# Patient Record
Sex: FEMALE | Race: WHITE
[De-identification: names, ages, dates, MRNs, and addresses within clinical notes are randomized per-mention and may not be internally consistent; named-entity substitution may affect disease eponyms.]

---

## 2018-05-20 ENCOUNTER — HOSPITAL ENCOUNTER (EMERGENCY)
Dept: HOSPITAL 89 - ER | Age: 58
Discharge: HOME | End: 2018-05-20
Payer: COMMERCIAL

## 2018-05-20 VITALS — DIASTOLIC BLOOD PRESSURE: 94 MMHG | SYSTOLIC BLOOD PRESSURE: 128 MMHG

## 2018-05-20 DIAGNOSIS — R00.8: Primary | ICD-10-CM

## 2018-05-20 DIAGNOSIS — R94.31: ICD-10-CM

## 2018-05-20 DIAGNOSIS — R55: ICD-10-CM

## 2018-05-20 DIAGNOSIS — R00.2: ICD-10-CM

## 2018-05-20 LAB
INR PPP: 0.96
PLATELET COUNT, AUTOMATED: 240 K/UL (ref 150–450)

## 2018-05-20 PROCEDURE — 84450 TRANSFERASE (AST) (SGOT): CPT

## 2018-05-20 PROCEDURE — 84520 ASSAY OF UREA NITROGEN: CPT

## 2018-05-20 PROCEDURE — 84460 ALANINE AMINO (ALT) (SGPT): CPT

## 2018-05-20 PROCEDURE — 82040 ASSAY OF SERUM ALBUMIN: CPT

## 2018-05-20 PROCEDURE — 85730 THROMBOPLASTIN TIME PARTIAL: CPT

## 2018-05-20 PROCEDURE — 85610 PROTHROMBIN TIME: CPT

## 2018-05-20 PROCEDURE — 71045 X-RAY EXAM CHEST 1 VIEW: CPT

## 2018-05-20 PROCEDURE — 84075 ASSAY ALKALINE PHOSPHATASE: CPT

## 2018-05-20 PROCEDURE — 93005 ELECTROCARDIOGRAM TRACING: CPT

## 2018-05-20 PROCEDURE — 99284 EMERGENCY DEPT VISIT MOD MDM: CPT

## 2018-05-20 PROCEDURE — 84484 ASSAY OF TROPONIN QUANT: CPT

## 2018-05-20 PROCEDURE — 83735 ASSAY OF MAGNESIUM: CPT

## 2018-05-20 PROCEDURE — 85025 COMPLETE CBC W/AUTO DIFF WBC: CPT

## 2018-05-20 PROCEDURE — 84155 ASSAY OF PROTEIN SERUM: CPT

## 2018-05-20 PROCEDURE — 83880 ASSAY OF NATRIURETIC PEPTIDE: CPT

## 2018-05-20 PROCEDURE — 84295 ASSAY OF SERUM SODIUM: CPT

## 2018-05-20 PROCEDURE — 82565 ASSAY OF CREATININE: CPT

## 2018-05-20 PROCEDURE — 85379 FIBRIN DEGRADATION QUANT: CPT

## 2018-05-20 PROCEDURE — 82247 BILIRUBIN TOTAL: CPT

## 2018-05-20 PROCEDURE — 82310 ASSAY OF CALCIUM: CPT

## 2018-05-20 PROCEDURE — 96360 HYDRATION IV INFUSION INIT: CPT

## 2018-05-20 PROCEDURE — 82947 ASSAY GLUCOSE BLOOD QUANT: CPT

## 2018-05-20 PROCEDURE — 82374 ASSAY BLOOD CARBON DIOXIDE: CPT

## 2018-05-20 PROCEDURE — 84132 ASSAY OF SERUM POTASSIUM: CPT

## 2018-05-20 PROCEDURE — 82435 ASSAY OF BLOOD CHLORIDE: CPT

## 2018-05-20 NOTE — ER REPORT
History and Physical


Time Seen By MD:  05:40


Hx. of Stated Complaint:  


PT WAS AWOKEN WITH SUDDEN CHEST PAIN.


HPI/ROS


CHIEF COMPLAINT: Palpitations, dizziness





HISTORY OF PRESENT ILLNESS: 57-year-old female presents to the ER.  He woke up 

with chest pain, feeling near syncopal..  She's been having bouts of 

tachycardia for 3-4 weeks, similar to episodes she had 10 years ago.  She was 

evaluated by Dr. Cheyanne Richard electrophysiologist at [facility].[address] 

[facility] [address], [place] [address] [phone] and underwent an 

ablation approximately 10 years ago.  She states she was on metoprolol back 

then..





REVIEW OF SYSTEMS:


Respiratory: As above


Cardiovascular: As above


Gastrointestinal: No vomiting, no abdominal pain.


Musculoskeletal: No back pain.


Allergies:  


Coded Allergies:  


     No Known Drug Allergies (Verified , 5/20/18)


Home Meds


Active Scripts


Metoprolol Tartrate (METOPROLOL TARTRATE) 25 Mg Tablet, 1 TAB PO BID for 

prevention of heart racing, #60 TAB


   Prov:FRANTZ SANTIAGO DO         5/20/18


Reported Medications


Lorazepam (Lorazepam) 1 Mg Tablet, 1 MG PO Q6H, #10 0 Refills


   1/29/10


Omega-3 Acid Ethyl Esters (Lovaza) 1 G Capsule, 1 G PO D, 0 Refills


   1/29/10


Hx Substance Use Disorder:  No


Hx Alcohol Use:  No


Constitutional





Vital Sign - Last 24 Hours








 5/20/18 5/20/18 5/20/18 5/20/18





 05:35 05:35 05:45 05:47


 


Temp 98.4   


 


Pulse 117   96


 


Resp 16   


 


B/P (MAP) 170/103 170/103 (125) 120/101 (107) 


 


Pulse Ox 93   98


 


O2 Delivery Room Air   


 


    





 5/20/18 5/20/18 5/20/18 5/20/18





 06:00 06:02 06:17 06:30


 


Pulse  84 83 


 


B/P (MAP) 125/91 (102)   128/94 (105)


 


Pulse Ox  97 97 





 5/20/18   





 06:32   


 


Pulse 86   


 


Resp 13   


 


Pulse Ox 96   








Physical Exam


 Vital signs stable, afebrile, pulse ox normal


 General Appearance: The patient is alert, has no immediate need for airway 

protection and no current signs of toxicity.  Skin warm, dry, pink, no acute 

distress


HEENT: Pupils equal and round no injection.  Oropharynx without redness or 

exudate, mucous.  Membranes are moist


Respiratory: Chest is non tender, lungs are clear to auscultation.  No chest 

wall tenderness


Cardiac: regular rate and rhythm.  Occasional ectopic beat.,  No murmur 

appreciated


Gastrointestinal: Abdomen is soft and non tender, no masses, bowel sounds 

normal.


Musculoskeletal:  Neck: Neck is supple and non tender.


Extremities have full range of motion and are non tender.  No edema, no calf 

tenderness


Skin: No rashes or lesions.





DIFFERENTIAL DIAGNOSIS: After history and physical exam differential diagnosis 

was considered for cardiac dysrhythmia, SVT, ventricular tachycardia,syncope 

including but not limited to vasovagal syncope, arrhythmia, dehydration, and 

blood loss.





Medical Decision Making


Data Points


Result Diagram:  


5/20/18 0550                                                                   

             5/20/18 0550





Laboratory





Hematology








Test


  5/20/18


05:50


 


Red Blood Count


  4.65 M/uL


(4.17-5.56)


 


Mean Corpuscular Volume


  86.9 fL


(80.0-96.0)


 


Mean Corpuscular Hemoglobin


  30.6 pg


(26.0-33.0)


 


Mean Corpuscular Hemoglobin


Concent 35.2 g/dL


(32.0-36.0)


 


Red Cell Distribution Width


  12.8 %


(11.5-14.5)


 


Mean Platelet Volume


  7.7 fL


(7.2-11.1)


 


Neutrophils (%) (Auto)


  55.7 %


(39.4-72.5)


 


Lymphocytes (%) (Auto)


  33.0 %


(17.6-49.6)


 


Monocytes (%) (Auto)


  6.8 %


(4.1-12.4)


 


Eosinophils (%) (Auto)


  3.6 %


(0.4-6.7)


 


Basophils (%) (Auto)


  0.9 %


(0.3-1.4)


 


Nucleated RBC Relative Count


(auto) 0.1 /100WBC 


 


 


Neutrophils # (Auto)


  3.9 K/uL


(2.0-7.4)


 


Lymphocytes # (Auto)


  2.3 K/uL


(1.3-3.6)


 


Monocytes # (Auto)


  0.5 K/uL


(0.3-1.0)


 


Eosinophils # (Auto)


  0.2 K/uL


(0.0-0.5)


 


Basophils # (Auto)


  0.1 K/uL


(0.0-0.1)


 


Nucleated RBC Absolute Count


(auto) 0.01 K/uL 


 


 


Prothrombin Time


  12.7 seconds


(12.0-14.4)


 


Prothromb Time International


Ratio 0.96 


 


 


Activated Partial


Thromboplast Time 29 seconds


(23-35)


 


D-Dimer Quantitative (PE/DVT)


  < 0.27 ug/ml


(0-0.50)


 


Sodium Level


  140 mmol/L


(137-145)


 


Potassium Level


  3.5 mmol/L


(3.5-5.0)


 


Chloride Level


  107 mmol/L


()


 


Carbon Dioxide Level


  21 mmol/L


(22-31)


 


Blood Urea Nitrogen


  20 mg/dl


(7-18)


 


Creatinine


  0.80 mg/dl


(0.52-1.04)


 


Glomerular Filtration Rate


Calc > 60.0 


 


 


Random Glucose


  113 mg/dl


()


 


Calcium Level


  9.4 mg/dl


(8.4-10.2)


 


Magnesium Level


  1.8 mg/dl


(1.7-2.2)


 


Total Bilirubin


  0.9 mg/dl


(0.2-1.3)


 


Aspartate Amino Transf


(AST/SGOT) 30 U/L (0-35) 


 


 


Alanine Aminotransferase


(ALT/SGPT) 31 U/L (0-56) 


 


 


Alkaline Phosphatase


  102 U/L


(0-126)


 


Troponin I < 0.012 ng/ml 


 


B-Type Natriuretic Peptide


  33 pg/ml


(0-100)


 


Total Protein


  6.3 gm/dl


(6.3-8.2)


 


Albumin


  3.8 g/dl


(3.5-5.0)








Chemistry








Test


  5/20/18


05:50


 


White Blood Count


  6.9 k/uL


(4.5-11.0)


 


Red Blood Count


  4.65 M/uL


(4.17-5.56)


 


Hemoglobin


  14.2 g/dL


(12.0-16.0)


 


Hematocrit


  40.4 %


(34.0-47.0)


 


Mean Corpuscular Volume


  86.9 fL


(80.0-96.0)


 


Mean Corpuscular Hemoglobin


  30.6 pg


(26.0-33.0)


 


Mean Corpuscular Hemoglobin


Concent 35.2 g/dL


(32.0-36.0)


 


Red Cell Distribution Width


  12.8 %


(11.5-14.5)


 


Platelet Count


  240 K/uL


(150-450)


 


Mean Platelet Volume


  7.7 fL


(7.2-11.1)


 


Neutrophils (%) (Auto)


  55.7 %


(39.4-72.5)


 


Lymphocytes (%) (Auto)


  33.0 %


(17.6-49.6)


 


Monocytes (%) (Auto)


  6.8 %


(4.1-12.4)


 


Eosinophils (%) (Auto)


  3.6 %


(0.4-6.7)


 


Basophils (%) (Auto)


  0.9 %


(0.3-1.4)


 


Nucleated RBC Relative Count


(auto) 0.1 /100WBC 


 


 


Neutrophils # (Auto)


  3.9 K/uL


(2.0-7.4)


 


Lymphocytes # (Auto)


  2.3 K/uL


(1.3-3.6)


 


Monocytes # (Auto)


  0.5 K/uL


(0.3-1.0)


 


Eosinophils # (Auto)


  0.2 K/uL


(0.0-0.5)


 


Basophils # (Auto)


  0.1 K/uL


(0.0-0.1)


 


Nucleated RBC Absolute Count


(auto) 0.01 K/uL 


 


 


Prothrombin Time


  12.7 seconds


(12.0-14.4)


 


Prothromb Time International


Ratio 0.96 


 


 


Activated Partial


Thromboplast Time 29 seconds


(23-35)


 


D-Dimer Quantitative (PE/DVT)


  < 0.27 ug/ml


(0-0.50)


 


Glomerular Filtration Rate


Calc > 60.0 


 


 


Calcium Level


  9.4 mg/dl


(8.4-10.2)


 


Magnesium Level


  1.8 mg/dl


(1.7-2.2)


 


Total Bilirubin


  0.9 mg/dl


(0.2-1.3)


 


Aspartate Amino Transf


(AST/SGOT) 30 U/L (0-35) 


 


 


Alanine Aminotransferase


(ALT/SGPT) 31 U/L (0-56) 


 


 


Alkaline Phosphatase


  102 U/L


(0-126)


 


Troponin I < 0.012 ng/ml 


 


B-Type Natriuretic Peptide


  33 pg/ml


(0-100)


 


Total Protein


  6.3 gm/dl


(6.3-8.2)


 


Albumin


  3.8 g/dl


(3.5-5.0)








Coagulation








Test


  5/20/18


05:50


 


Prothrombin Time 12.7 seconds 


 


Prothromb Time International


Ratio 0.96 


 


 


Activated Partial


Thromboplast Time 29 seconds 


 


 


D-Dimer Quantitative (PE/DVT) < 0.27 ug/ml 











EKG/Imaging


Imaging


12 lead EKG:


      Rhythm: Normal sinus rhythm, 94 bpm, with atrial premature complexes with 

Advair conduction/PVCs


      Axis: normal 


      QRS: normal


      ST segments: Nonspecific ST and T-wave changes related to aberrant 

conduction.





ED Course/Re-evaluation


Clinical Indication for ER IV:  Hydration, IV Access


ED Course


Patient was admitted to an examination room.  H&P was done.  The differential 

diagnoses was considered.  On clinical examination.  Patient has stable vital 

signs.  On the cardiac rhythm monitor.  Patient has frequent bigeminy.  She has 

a parent wide-complex beats intermittently.  Patient notes and she has he is a 

parent conductions.  She states she awoke with shortness of breath, near-

syncope and chest tightness.  She's been having bouts of this for several weeks 

potentially 4 weeks.  They become intermittent.  This morning she had profound 

symptoms.  He presented to the ER for evaluation.  Diagnostic evaluation shows 

an EKG with bigeminy.  Patient's diagnostic studies, troponin, d-dimer, BNP and 

magnesium are unreemarkable.  Patient given metoprolol 25 mg by mouth.  She is 

advised to continue metoprolol 25 mg by mouth twice a day follow-up with Dr. Montano local cardiologist and Dr. Richard cardiology/electrophysiology  Specialist, 

who she has previously been evaluated by.


Decision to Disposition Date:  May 20, 2018


Decision to Disposition Time:  06:03





Depart


Departure


Latest Vital Signs





Vital Signs








  Date Time  Temp Pulse Resp B/P (MAP) Pulse Ox O2 Delivery O2 Flow Rate FiO2


 


5/20/18 06:32  86 13  96   


 


5/20/18 06:30    128/94 (105)    


 


5/20/18 05:35 98.4     Room Air  








Impression:  


 Primary Impression:  


 Palpitations


 Additional Impressions:  


 Bigeminy


 History of cardiac dysrhythmia


 History of prior ablation treatment


Condition:  Improved


Disposition:  HOME OR SELF-CARE


New Scripts


Metoprolol Tartrate (METOPROLOL TARTRATE) 25 Mg Tablet


1 TAB PO BID for prevention of heart racing, #60 TAB


   Prov: FRANTZ SANTIAGO DO         5/20/18


Patient Instructions:  Palpitations (ED)





Additional Instructions:  


Follow-up with cardiology, Dr. Montano and Dr. Richard





Problem Qualifiers











FRANTZ SANTIAGO DO May 20, 2018 06:01

## 2018-05-20 NOTE — RADIOLOGY IMAGING REPORT
FACILITY: [facility] [place] 

 

PATIENT NAME: Kathryn Mcclellan

: 1960

MR: [id number]

V: [id number]

EXAM DATE: 

ORDERING PHYSICIAN: FRANTZ SANTIAGO

TECHNOLOGIST: 

 

[place] [facility] [place]: Kathryn Mcclellan

: 1960

MRN: [id number]

Visit/Account:[id number]

Date of Sevice:  2018

 

ACCESSION #: [id number]

 

CHEST SINGLE AP

 

Additional pertinent History:   Chest pain

 

COMPARISON STUDIES:  2010

 

FINDINGS:

Support lines and catheters: EKG wire leads

 

Lungs and Pleura:  Minimal scarring in the left lower chest. No consolidation. No pneumothorax. No ef
fusion. No parenchymal mass lesion.

 

Heart and vasculature:  Cardiac and hilar structures well-maintained.

 

Renee and Mediastinum:  Negative.

 

Bones and Chest wall:  Bony structures unremarkable.

 

Upper Abdomen:  Negative.

 

IMPRESSION:

1. Negative chest for acute cardiopulmonary disease. Minimal scarring in the left lung base.

 

Report Dictated By: Trevor Anderson MD at 2018 6:39 AM

 

Report E-Signed By: Trevor Anderson MD  at 2018 6:41 AM

 

[id number]

## 2018-05-20 NOTE — EKG
FACILITY: [facility] [place] 

 

PATIENT NAME: ALEJO GEORGES

: [id number]

MR: [id number]

V: [id number]

EXAM DATE: 

ORDERING PHYSICIAN: FRANTZ SANTIAGO

TECHNOLOGIST: ALECIA

 

Test Reason : CHEST PAIN

Blood Pressure : ***/*** mmHG

Vent. Rate : 094 BPM     Atrial Rate : 094 BPM

   P-R Int : 136 ms          QRS Dur : 078 ms

    QT Int : 354 ms       P-R-T Axes : 072 061 085 degrees

   QTc Int : 442 ms

 

Sinus rhythm with Possible premature atrial complexes with aberrant conduction

Low voltage QRS

Nonspecific ST abnormality

Abnormal ECG

No previous ECGs available

Confirmed by VIRGINIA HINOJOSA ([id number]) on 2018 6:44:04 AM

 

Referred By:             Confirmed By:VIRGINIA HINOJOSA

## 2019-02-01 ENCOUNTER — HOSPITAL ENCOUNTER (OUTPATIENT)
Dept: HOSPITAL 89 - RAD | Age: 59
End: 2019-02-01
Attending: FAMILY MEDICINE
Payer: COMMERCIAL

## 2019-02-01 DIAGNOSIS — J40: ICD-10-CM

## 2019-02-01 DIAGNOSIS — I45.81: ICD-10-CM

## 2019-02-01 DIAGNOSIS — R94.31: Primary | ICD-10-CM

## 2019-02-01 PROCEDURE — 93005 ELECTROCARDIOGRAM TRACING: CPT

## 2019-02-01 PROCEDURE — 71046 X-RAY EXAM CHEST 2 VIEWS: CPT

## 2019-02-01 NOTE — EKG
FACILITY: [facility] 

 

PATIENT NAME: ALEJO GEORGES

: [id number]

MR: [id number]

V: [id number]

EXAM DATE: 

ORDERING PHYSICIAN: ABDIRASHID MATHEW

TECHNOLOGIST: FEDERICO

 

Test Reason : ABNORMAL HEART SOUND

Blood Pressure : ***/*** mmHG

Vent. Rate : 096 BPM     Atrial Rate : 096 BPM

   P-R Int : 124 ms          QRS Dur : 082 ms

    QT Int : 366 ms       P-R-T Axes : 076 057 061 degrees

   QTc Int : 462 ms

 

Sinus rhythm with premature ventricular complexes

Low voltage QRS

ST abnormality non-specific

Abnormal ECG

When compared with ECG of 20-MAY-2018 05:45,

No significant change was found

Confirmed by Miguel Butler ([id number]) on 2019 9:09:00 PM

 

Referred By:  PRIYANKA           Confirmed By:Miguel Harris

## 2019-02-01 NOTE — RADIOLOGY IMAGING REPORT
FACILITY: [facility] [place] 

 

PATIENT NAME: Kathryn Mcclellan

: 1960

MR: [id number]

V: [id number]

EXAM DATE: 

ORDERING PHYSICIAN: ABDIRASHID MATHEW

TECHNOLOGIST: 

 

Location: [facility] [place]

Patient: Kathryn Mcclellan

: 1960

MRN: [id number]

Visit/Account:[id number]

Date of Sevice:  2019

 

ACCESSION #: [id number]

 

CHEST PA LAT

 

INDICATION:  Chronic cough

 

COMPARISON: 2018

 

FINDINGS:

Heart size within normal limits.

There is no focal infiltrate or lobar consolidation.

There is no pneumothorax or pleural effusion.

 

 

IMPRESSION:

1. No acute cardiopulmonary process.

 

Report Dictated By: Pernell Stoddard at 2019 5:50 PM

 

Report E-Signed By: Pernell Stoddard  at 2019 5:50 PM

 

WSN:[id number]